# Patient Record
Sex: FEMALE | Race: BLACK OR AFRICAN AMERICAN | NOT HISPANIC OR LATINO | Employment: UNEMPLOYED | URBAN - METROPOLITAN AREA
[De-identification: names, ages, dates, MRNs, and addresses within clinical notes are randomized per-mention and may not be internally consistent; named-entity substitution may affect disease eponyms.]

---
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
## 2024-01-10 ENCOUNTER — MYC MEDICAL ADVICE (OUTPATIENT)
Dept: DERMATOLOGY | Facility: CLINIC | Age: 19
End: 2024-01-10
Payer: COMMERCIAL

## 2024-02-29 ENCOUNTER — MYC MEDICAL ADVICE (OUTPATIENT)
Dept: DERMATOLOGY | Facility: CLINIC | Age: 19
End: 2024-02-29
Payer: COMMERCIAL

## 2024-04-08 ENCOUNTER — PATIENT OUTREACH (OUTPATIENT)
Dept: CARE COORDINATION | Facility: CLINIC | Age: 19
End: 2024-04-08
Payer: COMMERCIAL

## 2024-04-08 NOTE — PROGRESS NOTES
Clinic Care Coordination Contact  Program:   [facility]: [place]   Renewal: [facility]   Date Applied:      JOSIAH Outreach:   4/8/24:  1st outreach attempt. Left a message on voicemail with call back information and requested return call.  Plan: CTA will call again within 2 weeks.  Tammy Pena  Care   [facility]  Clinic Care Coordination  [phone]         Health Insurance:        Referral/Screening:

## 2024-04-22 ENCOUNTER — PATIENT OUTREACH (OUTPATIENT)
Dept: CARE COORDINATION | Facility: CLINIC | Age: 19
End: 2024-04-22
Payer: COMMERCIAL

## 2024-04-22 NOTE — PROGRESS NOTES
Clinic Care Coordination Contact  Program:   [facility]: [place]   Renewal: [facility]   Date Applied:      JOSIAH Outreach:   4/22/24: 2nd outreach attempt. Left message on voicemail indicating last outreach attempt. CTA left [facility] number for renewal follow up.  Plan: CTA will no longer make outreach  Tammy Carranza   [facility]  Clinic Care Coordination  [phone]    4/8/24:  1st outreach attempt. Left a message on voicemail with call back information and requested return call.  Plan: CTA will call again within 2 weeks.  Tammy Carranza   [facility]  Clinic Care Coordination  [phone]         Health Insurance:        Referral/Screening:

## 2024-05-21 ENCOUNTER — MYC MEDICAL ADVICE (OUTPATIENT)
Dept: DERMATOLOGY | Facility: CLINIC | Age: 19
End: 2024-05-21
Payer: COMMERCIAL

## 2024-07-06 ENCOUNTER — HEALTH MAINTENANCE LETTER (OUTPATIENT)
Age: 19
End: 2024-07-06

## 2024-07-12 NOTE — PROGRESS NOTES
[facility] Dermatology Note  Encounter Date: Jul 15, 2024  Office Visit     Dermatology Problem List:  1. Acne Vulgaris   Current tx: , Benzoyl peroxide wash, tretinoin 0.025% cream nightly, Azelaic Acid 15%  Previous tx: clindamycin 1% gel  2. Hyperpigmentation/PIH, bilateral axillae    We see the patients mom    ____________________________________________    Assessment & Plan:    #Acne Vulgaris. Using abstinence. Still not at goal. Completed doxycycline 180 days. Next menses due around 7/28/24  -Tretinoin nightly, alternating 0.025% with newer 0.05%  -stop acne medications  - Reviewed options of spironolactone, OCP, or Accutane. Pt wants accutane and is abstinent. Patient agreeable to start Accutane process.  Plan to start at lower dose.  -Discussion of the risks and side effects of isotretinoin including but not limited to mucocutaneous dryness, arthralgias, myalgias, depression, suicidal ideation, headache, blurred vision, increase in liver function tests, increase in lipids, and teratogenic effects. Discussed need for two forms of contraception. The iPledge program brochure was provided and the contents discussed with the patient. The patient was counseled they cannot give blood while on isotretinoin. No personal or family history of inflammatory bowel disease or hypertriglyceridemia known to patient. Reviewed need to avoid alcohol on medication. iPledge program consent was obtained. Patient counseled that if they wear contacts eye may become too dry to tolerate and recommend follow up with eye doctor if this occurs. Counseled on possibility of of bloody nose. Reviewed to call for flares. Reviewed photosensitivity and need for sunscreen. Avoid skin procedures unless done by this clinic. The patient will stop all acne medications and use gentle cleansing products such as Cetaphil or CeraVe.       -Baseline labs including alt and fasting triglycerides HCG  will be obtained at baseline    -Total cumulative dose 0 mg. (Date 7/15/24)    - Reviewed Winlevi option but not covered.    - Labs ordered today: ALT, AST, Triglycerides, hCG quantitative   -Future considerations: OCP's, doxycycline, or spirolactone          Procedures Performed:   none    Follow-up: 7 week(s) virtually (telephone with photos), or earlier for new or changing lesions    Staff and Scribe:     Scribe Disclosure:   I, Radha Castillo, am serving as a scribe to document services personally performed by Gilma Felipe MD based on data collection and the provider's statements to me.     Provider Disclosure:   The documentation recorded by the scribe accurately reflects the services I personally performed and the decisions made by me.    Gilma Felipe MD    Department of Dermatology  [facility]: Phone: [phone], Fax:[phone]  [facility]: Phone: [phone], Fax: [phone]   ____________________________________________    CC: Acne (Patient reports that acne is unstable, closed comedones have improved. Patient reports medications have also been helping. )    HPI:  Ms. Isamar Oliveria is a(n) 18 year old female who presents today as a return patient for acne.    Today, patient reports she feels acne is unstable but have had some improvements. Completed doxycycline. Alternating between tretinoin 0.05% and 0.025%. Using the clindamycin in the AM.       Patient is otherwise feeling well, without additional skin concerns.    Labs Reviewed:  N/A    Physical Exam:  Vitals: There were no vitals taken for this visit.  SKIN: Focused examination of face was performed.  - Acneiform papules and pustules on R cheek and forehead.     - No other lesions of concern on areas examined.     Medications:  Current Outpatient Medications   Medication Sig Dispense Refill    albuterol (PROAIR HFA/PROVENTIL HFA/VENTOLIN HFA) 108 (90  Base) MCG/ACT inhaler Inhale 2 puffs into the lungs every 6 hours as needed for shortness of breath, wheezing or cough 18 g 0    clindamycin (CLEOCIN T) 1 % external lotion Apply once daily to affected area 60 mL 5    doxycycline hyclate (VIBRAMYCIN) 100 MG capsule Take 1 capsule (100 mg) by mouth 2 times daily 180 capsule 0    tretinoin (RETIN-A) 0.05 % external cream Use a pea sized every other night on the face 45 g 3    tretinoin (RETIN-A) 0.025 % external cream Use a pea sized every other night on the face (Patient not taking: Reported on 7/15/2024) 45 g 3     No current facility-administered medications for this visit.      Past Medical History:   There is no problem list on file for this patient.    No past medical history on file.     CC Gilma Felipe MD  [address] [facility] [address]  [place] [address] on close of this encounter.

## 2024-07-15 ENCOUNTER — OFFICE VISIT (OUTPATIENT)
Dept: DERMATOLOGY | Facility: CLINIC | Age: 19
End: 2024-07-15
Attending: DERMATOLOGY
Payer: COMMERCIAL

## 2024-07-15 ENCOUNTER — MYC MEDICAL ADVICE (OUTPATIENT)
Dept: DERMATOLOGY | Facility: CLINIC | Age: 19
End: 2024-07-15

## 2024-07-15 DIAGNOSIS — Z76.89 ENCOUNTER PRIOR TO INITIATION OF MEDICATION: Primary | ICD-10-CM

## 2024-07-15 DIAGNOSIS — L70.0 ACNE VULGARIS: ICD-10-CM

## 2024-07-15 PROCEDURE — 99214 OFFICE O/P EST MOD 30 MIN: CPT | Performed by: DERMATOLOGY

## 2024-07-15 RX ORDER — AZELAIC ACID 0.15 G/G
GEL TOPICAL
Qty: 50 G | Refills: 3 | Status: CANCELLED | OUTPATIENT
Start: 2024-07-15

## 2024-07-15 ASSESSMENT — PAIN SCALES - GENERAL: PAINLEVEL: NO PAIN (0)

## 2024-07-15 NOTE — LETTER
7/15/2024      Isamar Oliveira  [address]  [place] [address]      Dear Colleague,    Thank you for referring your patient, Isamar Oliveira, to the [facility]. Please see a copy of my visit note below.      [facility] Dermatology Note  Encounter Date: Jul 15, 2024  Office Visit     Dermatology Problem List:  1. Acne Vulgaris   Current tx: , Benzoyl peroxide wash, tretinoin 0.025% cream nightly, Azelaic Acid 15%  Previous tx: clindamycin 1% gel  2. Hyperpigmentation/PIH, bilateral axillae    We see the patients mom    ____________________________________________    Assessment & Plan:    #Acne Vulgaris. Using abstinence. Still not at goal. Completed doxycycline 180 days. Next menses due around 7/28/24  -Tretinoin nightly, alternating 0.025% with newer 0.05%  -stop acne medications  - Reviewed options of spironolactone, OCP, or Accutane. Pt wants accutane and is abstinent. Patient agreeable to start Accutane process.  Plan to start at lower dose.  -Discussion of the risks and side effects of isotretinoin including but not limited to mucocutaneous dryness, arthralgias, myalgias, depression, suicidal ideation, headache, blurred vision, increase in liver function tests, increase in lipids, and teratogenic effects. Discussed need for two forms of contraception. The iPledge program brochure was provided and the contents discussed with the patient. The patient was counseled they cannot give blood while on isotretinoin. No personal or family history of inflammatory bowel disease or hypertriglyceridemia known to patient. Reviewed need to avoid alcohol on medication. iPledge program consent was obtained. Patient counseled that if they wear contacts eye may become too dry to tolerate and recommend follow up with eye doctor if this occurs. Counseled on possibility of of bloody nose. Reviewed to call for flares. Reviewed photosensitivity and need for sunscreen. Avoid skin  procedures unless done by this clinic. The patient will stop all acne medications and use gentle cleansing products such as Cetaphil or CeraVe.       -Baseline labs including alt and fasting triglycerides HCG  will be obtained at baseline   -Total cumulative dose 0 mg. (Date 7/15/24)    - Reviewed Winlevi option but not covered.    - Labs ordered today: ALT, AST, Triglycerides, hCG quantitative   -Future considerations: OCP's, doxycycline, or spirolactone          Procedures Performed:   none    Follow-up: 7 week(s) virtually (telephone with photos), or earlier for new or changing lesions    Staff and Scribe:     Scribe Disclosure:   I, Radha Castillo, am serving as a scribe to document services personally performed by Gilma Felipe MD based on data collection and the provider's statements to me.     Provider Disclosure:   The documentation recorded by the scribe accurately reflects the services I personally performed and the decisions made by me.    Gilma Felipe MD    Department of Dermatology  [facility]: Phone: [phone], Fax:[phone]  [facility]: Phone: [phone], Fax: [phone]   ____________________________________________    CC: Acne (Patient reports that acne is unstable, closed comedones have improved. Patient reports medications have also been helping. )    HPI:  Ms. Isamar Oliveira is a(n) 18 year old female who presents today as a return patient for acne.    Today, patient reports she feels acne is unstable but have had some improvements. Completed doxycycline. Alternating between tretinoin 0.05% and 0.025%. Using the clindamycin in the AM.       Patient is otherwise feeling well, without additional skin concerns.    Labs Reviewed:  N/A    Physical Exam:  Vitals: There were no vitals taken for this visit.  SKIN: Focused examination of face was performed.  - Acneiform  papules and pustules on R cheek and forehead.     - No other lesions of concern on areas examined.     Medications:  Current Outpatient Medications   Medication Sig Dispense Refill     albuterol (PROAIR HFA/PROVENTIL HFA/VENTOLIN HFA) 108 (90 Base) MCG/ACT inhaler Inhale 2 puffs into the lungs every 6 hours as needed for shortness of breath, wheezing or cough 18 g 0     clindamycin (CLEOCIN T) 1 % external lotion Apply once daily to affected area 60 mL 5     doxycycline hyclate (VIBRAMYCIN) 100 MG capsule Take 1 capsule (100 mg) by mouth 2 times daily 180 capsule 0     tretinoin (RETIN-A) 0.05 % external cream Use a pea sized every other night on the face 45 g 3     tretinoin (RETIN-A) 0.025 % external cream Use a pea sized every other night on the face (Patient not taking: Reported on 7/15/2024) 45 g 3     No current facility-administered medications for this visit.      Past Medical History:   There is no problem list on file for this patient.    No past medical history on file.     CC Gilma Felipe MD  [address] [place] [address] [address]  [place] [address] on close of this encounter.      Again, thank you for allowing me to participate in the care of your patient.        Sincerely,        Gilma Felipe MD

## 2024-07-15 NOTE — NURSING NOTE
Dermatology Rooming Note    Isamar Oliveira's goals for this visit include:   Chief Complaint   Patient presents with    Acne     Patient reports that acne is unstable, closed comedones have improved. Patient reports medications have also been helping.         - Does patient need refills? No    - Last visit was 10- (virtual) w/ Emily Hassan

## 2024-07-22 ENCOUNTER — ALLIED HEALTH/NURSE VISIT (OUTPATIENT)
Dept: DERMATOLOGY | Facility: CLINIC | Age: 19
End: 2024-07-22
Payer: COMMERCIAL

## 2024-07-22 DIAGNOSIS — L70.0 ACNE VULGARIS: Primary | ICD-10-CM

## 2024-07-22 PROCEDURE — 99207 PR NO CHARGE NURSE ONLY: CPT | Performed by: DERMATOLOGY

## 2024-07-22 NOTE — NURSING NOTE
Isamar Oliveira comes into clinic today at the request of Dr. Felipe Ordering Provider for Accutane.      This service provided today was under the supervising provider of the day Dr. Felipe, who was available if needed.    Jo Menjivar RN Pt presents to clinic to sign iPledge forms to initiate Accutane. I explained the importance of timing with Accutane in regards to submitting a pregnancy test as well as picking up the prescription in the allotted time frame. Pt verbalized understanding.

## 2024-08-01 ENCOUNTER — LAB (OUTPATIENT)
Dept: LAB | Facility: CLINIC | Age: 19
End: 2024-08-01
Payer: COMMERCIAL

## 2024-08-01 DIAGNOSIS — Z76.89 ENCOUNTER PRIOR TO INITIATION OF MEDICATION: ICD-10-CM

## 2024-08-01 LAB
ALT SERPL W P-5'-P-CCNC: 10 U/L (ref 0–50)
AST SERPL W P-5'-P-CCNC: 20 U/L (ref 0–35)
FASTING STATUS PATIENT QL REPORTED: YES
HCG INTACT+B SERPL-ACNC: <1 MIU/ML
TRIGL SERPL-MCNC: 46 MG/DL

## 2024-08-01 PROCEDURE — 84702 CHORIONIC GONADOTROPIN TEST: CPT

## 2024-08-01 PROCEDURE — 84478 ASSAY OF TRIGLYCERIDES: CPT

## 2024-08-01 PROCEDURE — 36415 COLL VENOUS BLD VENIPUNCTURE: CPT

## 2024-08-01 PROCEDURE — 84450 TRANSFERASE (AST) (SGOT): CPT

## 2024-08-01 PROCEDURE — 84460 ALANINE AMINO (ALT) (SGPT): CPT

## 2024-08-01 SDOH — HEALTH STABILITY: PHYSICAL HEALTH: ON AVERAGE, HOW MANY DAYS PER WEEK DO YOU ENGAGE IN MODERATE TO STRENUOUS EXERCISE (LIKE A BRISK WALK)?: 4 DAYS

## 2024-08-01 SDOH — HEALTH STABILITY: PHYSICAL HEALTH: ON AVERAGE, HOW MANY MINUTES DO YOU ENGAGE IN EXERCISE AT THIS LEVEL?: 30 MIN

## 2024-08-01 ASSESSMENT — SOCIAL DETERMINANTS OF HEALTH (SDOH): HOW OFTEN DO YOU GET TOGETHER WITH FRIENDS OR RELATIVES?: TWICE A WEEK

## 2024-08-02 ENCOUNTER — OFFICE VISIT (OUTPATIENT)
Dept: FAMILY MEDICINE | Facility: CLINIC | Age: 19
End: 2024-08-02
Payer: COMMERCIAL

## 2024-08-02 VITALS
BODY MASS INDEX: 22.71 KG/M2 | HEIGHT: 64 IN | RESPIRATION RATE: 16 BRPM | WEIGHT: 133 LBS | DIASTOLIC BLOOD PRESSURE: 75 MMHG | HEART RATE: 69 BPM | TEMPERATURE: 97.9 F | SYSTOLIC BLOOD PRESSURE: 109 MMHG | OXYGEN SATURATION: 100 %

## 2024-08-02 DIAGNOSIS — R14.0 ABDOMINAL BLOATING: ICD-10-CM

## 2024-08-02 DIAGNOSIS — Z00.00 ROUTINE GENERAL MEDICAL EXAMINATION AT A HEALTH CARE FACILITY: Primary | ICD-10-CM

## 2024-08-02 DIAGNOSIS — R06.2 WHEEZING: ICD-10-CM

## 2024-08-02 DIAGNOSIS — Z11.3 SCREENING FOR STDS (SEXUALLY TRANSMITTED DISEASES): ICD-10-CM

## 2024-08-02 PROCEDURE — 99395 PREV VISIT EST AGE 18-39: CPT | Performed by: PREVENTIVE MEDICINE

## 2024-08-02 PROCEDURE — 99213 OFFICE O/P EST LOW 20 MIN: CPT | Mod: 25 | Performed by: PREVENTIVE MEDICINE

## 2024-08-02 RX ORDER — FLUTICASONE PROPIONATE AND SALMETEROL 100; 50 UG/1; UG/1
1 POWDER RESPIRATORY (INHALATION) EVERY 12 HOURS
Qty: 60 EACH | Refills: 3 | Status: SHIPPED | OUTPATIENT
Start: 2024-08-02

## 2024-08-02 ASSESSMENT — PAIN SCALES - GENERAL: PAINLEVEL: NO PAIN (0)

## 2024-08-02 NOTE — PATIENT INSTRUCTIONS
To schedule lung function tests please call .       Patient Education   Preventive Care Advice   This is general advice given by our system to help you stay healthy. However, your care team may have specific advice just for you. Please talk to your care team about your preventive care needs.  Nutrition  Eat 5 or more servings of fruits and vegetables each day.  Try wheat bread, brown rice and whole grain pasta (instead of white bread, rice, and pasta).  Get enough calcium and vitamin D. Check the label on foods and aim for 100% of the RDA (recommended daily allowance).  Lifestyle  Exercise at least 150 minutes each week  (30 minutes a day, 5 days a week).  Do muscle strengthening activities 2 days a week. These help control your weight and prevent disease.  No smoking.  Wear sunscreen to prevent skin cancer.  Have a dental exam and cleaning every 6 months.  Yearly exams  See your health care team every year to talk about:  Any changes in your health.  Any medicines your care team has prescribed.  Preventive care, family planning, and ways to prevent chronic diseases.  Shots (vaccines)   HPV shots (up to age 26), if you've never had them before.  Hepatitis B shots (up to age 59), if you've never had them before.  COVID-19 shot: Get this shot when it's due.  Flu shot: Get a flu shot every year.  Tetanus shot: Get a tetanus shot every 10 years.  Pneumococcal, hepatitis A, and RSV shots: Ask your care team if you need these based on your risk.  Shingles shot (for age 50 and up)  General health tests  Diabetes screening:  Starting at age 35, Get screened for diabetes at least every 3 years.  If you are younger than age 35, ask your care team if you should be screened for diabetes.  Cholesterol test: At age 39, start having a cholesterol test every 5 years, or more often if advised.  Bone density scan (DEXA): At age 50, ask your care team if you should have this scan for osteoporosis (brittle  bones).  Hepatitis C: Get tested at least once in your life.  STIs (sexually transmitted infections)  Before age 24: Ask your care team if you should be screened for STIs.  After age 24: Get screened for STIs if you're at risk. You are at risk for STIs (including HIV) if:  You are sexually active with more than one person.  You don't use condoms every time.  You or a partner was diagnosed with a sexually transmitted infection.  If you are at risk for HIV, ask about PrEP medicine to prevent HIV.  Get tested for HIV at least once in your life, whether you are at risk for HIV or not.  Cancer screening tests  Cervical cancer screening: If you have a cervix, begin getting regular cervical cancer screening tests starting at age 21.  Breast cancer scan (mammogram): If you've ever had breasts, begin having regular mammograms starting at age 40. This is a scan to check for breast cancer.  Colon cancer screening: It is important to start screening for colon cancer at age 45.  Have a colonoscopy test every 10 years (or more often if you're at risk) Or, ask your provider about stool tests like a FIT test every year or Cologuard test every 3 years.  To learn more about your testing options, visit:   .  For help making a decision, visit:   https://bit.ly/ds17582.  Prostate cancer screening test: If you have a prostate, ask your care team if a prostate cancer screening test (PSA) at age 55 is right for you.  Lung cancer screening: If you are a current or former smoker ages 50 to 80, ask your care team if ongoing lung cancer screenings are right for you.  For informational purposes only. Not to replace the advice of your health care provider. Copyright   2023 [employer]. All rights reserved. Clinically reviewed by the [facility] Transitions Program. [employer] 968126 - REV 01/24.

## 2024-08-02 NOTE — PROGRESS NOTES
Preventive Care Visit  [facility] PATT Paniagua MD, Family Medicine  Aug 2, 2024      Assessment & Plan     Routine general medical examination at a health care facility  -screening guidelines reviewed   - REVIEW OF HEALTH MAINTENANCE PROTOCOL ORDERS    Screening for STDs (sexually transmitted diseases)  -NOT needed since patient has never been sexually active     Wheezing  -diagnosis of Asthma no established hence proceed with Lung function tests for diagnostic purposes   - General PFT Lab (Please always keep checked)  - Pulmonary Function Test  - fluticasone-salmeterol (ADVAIR) 100-50 MCG/ACT inhaler  Dispense: 60 each; Refill: 3    Abdominal bloating  -await labs   - Helicobacter pylori Antigen Stool          Counseling  Appropriate preventive services were addressed with this patient via screening, questionnaire, or discussion as appropriate for fall prevention, nutrition, physical activity, Tobacco-use cessation, weight loss and cognition.  Checklist reviewing preventive services available has been given to the patient.  Reviewed patient's diet, addressing concerns and/or questions.   The patient was instructed to see the dentist every 6 months.       Delmar Penn is a 18 year old, presenting for the following:  Physical        8/2/2024     9:48 AM   Additional Questions   Roomed by roque   Accompanied by self        Health Care Directive  Patient does not have a Health Care Directive or Living Will: Discussed advance care planning with patient; information given to patient to review.    HPI  Patient would like to discuss asthma; patient stated that she has noticed that it has been becoming increasingly worse- even when she is not physically active- patient also states that she notices it gets worse when she is sick as well      Patient would also like to discuss digestive problems; states that she noticed she is painfully bloated a lot with loose stool     More asthma  symptoms+  No night time symptoms  Has been using inhaler  More with cold or URI   Symptoms is more wheezing and coughing     No tobacco  No vaping     Dad with asthma     More bloating with food+  More gassiness  Not much belching and burping  More around menses too.       On Accutane for acne per Derm         8/1/2024   General Health   How would you rate your overall physical health? Good   Feel stress (tense, anxious, or unable to sleep) Not at all            8/1/2024   Nutrition   Three or more servings of calcium each day? Yes   Diet: Regular (no restrictions)   How many servings of fruit and vegetables per day? (!) 2-3   How many sweetened beverages each day? 0-1            8/1/2024   Exercise   Days per week of moderate/strenous exercise 4 days   Average minutes spent exercising at this level 30 min            8/1/2024   Social Factors   Frequency of gathering with friends or relatives Twice a week   Worry food won't last until get money to buy more No   Food not last or not have enough money for food? No   Do you have housing? (Housing is defined as stable permanent housing and does not include staying ouside in a car, in a tent, in an abandoned building, in an overnight shelter, or couch-surfing.) No   Are you worried about losing your housing? No   Lack of transportation? No   Unable to get utilities (heat,electricity)? No   Want help with housing or utility concern? No      (!) HOUSING CONCERN PRESENT      8/1/2024   Dental   Dentist two times every year? (!) NO            8/1/2024   TB Screening   Were you born outside of the [place]? No              Today's PHQ-2 Score:       7/15/2024    12:38 PM   PHQ-2 ( 1999 [employer])   Q1: Little interest or pleasure in doing things 0   Q2: Feeling down, depressed or hopeless 0   PHQ-2 Score 0         8/1/2024   Substance Use   Alcohol more than 3/day or more than 7/wk Not Applicable   Do you use any other substances recreationally? No        Social History     Tobacco  Use    Smoking status: Never    Smokeless tobacco: Never   Vaping Use    Vaping status: Never Used   Substance Use Topics    Alcohol use: No    Drug use: No           8/1/2024   STI Screening   New sexual partner(s) since last STI/HIV test? (!) DECLINE        History of abnormal Pap smear: No - under age 21, PAP not appropriate for age             8/1/2024   Contraception/Family Planning   Questions about contraception or family planning No           Reviewed and updated as needed this visit by Provider   Tobacco  Allergies  Meds  Problems  Med Hx  Surg Hx  Fam Hx            History reviewed. No pertinent past medical history.  History reviewed. No pertinent surgical history.  Lab work is in process  Labs reviewed in EPIC  BP Readings from Last 3 Encounters:   08/02/24 109/75   04/14/23 106/71   07/09/21 99/65 (14%, Z = -1.08 /  46%, Z = -0.10)*     *BP percentiles are based on the 2017 AAP Clinical Practice Guideline for girls    Wt Readings from Last 3 Encounters:   08/02/24 60.3 kg (133 lb) (59%, Z= 0.23)*   10/16/23 56.7 kg (125 lb) (48%, Z= -0.05)*   04/14/23 56.2 kg (124 lb) (49%, Z= -0.03)*     * Growth percentiles are based on CDC (Girls, 2-20 Years) data.                  There is no problem list on file for this patient.    History reviewed. No pertinent surgical history.    Social History     Tobacco Use    Smoking status: Never    Smokeless tobacco: Never   Substance Use Topics    Alcohol use: No     Family History   Problem Relation Age of Onset    Glaucoma No family hx of     Macular Degeneration No family hx of          Current Outpatient Medications   Medication Sig Dispense Refill    albuterol (PROAIR HFA/PROVENTIL HFA/VENTOLIN HFA) 108 (90 Base) MCG/ACT inhaler Inhale 2 puffs into the lungs every 6 hours as needed for shortness of breath, wheezing or cough 18 g 0    fluticasone-salmeterol (ADVAIR) 100-50 MCG/ACT inhaler Inhale 1 puff into the lungs every 12 hours 60 each 3    tretinoin  "(RETIN-A) 0.05 % external cream Use a pea sized every other night on the face 45 g 3     No Known Allergies      Review of Systems  Constitutional, HEENT, cardiovascular, pulmonary, gi and gu systems are negative, except as otherwise noted.     Objective    Exam  /75 (BP Location: Left arm, Patient Position: Sitting, Cuff Size: Adult Regular)   Pulse 69   Temp 97.9  F (36.6  C) (Temporal)   Resp 16   Ht 1.626 m (5' 4\")   Wt 60.3 kg (133 lb)   LMP 07/26/2024   SpO2 100%   BMI 22.83 kg/m     Estimated body mass index is 22.83 kg/m  as calculated from the following:    Height as of this encounter: 1.626 m (5' 4\").    Weight as of this encounter: 60.3 kg (133 lb).    Physical Exam  GENERAL APPEARANCE: healthy, alert and no distress  EYES: Eyes grossly normal to inspection and conjunctivae and sclerae normal  RESP: lungs clear to auscultation - no rales, rhonchi or wheezes  CV: regular rates and rhythm, normal S1 S2, no S3 or S4 and no murmur, click or rub  ABDOMEN: soft, non-tender and no rebound or guarding   MS: extremities normal- no gross deformities noted and peripheral pulses normal  NEURO: Normal strength and tone, mentation intact and speech normal  PSYCH: mentation appears normal    : Exam declined by parent/patient.  Reason for decline: Patient/Parental preference      Vision Screen  Vision Screen Details  Does the patient have corrective lenses (glasses/contacts)?: No  Vision Acuity Screen  Vision Acuity Tool: Keller  RIGHT EYE: 10/16 (20/32)  LEFT EYE: 10/16 (20/32)  Vision Screen Results: Pass    Hearing Screen           Signed Electronically by: Bethany Paniagua MD MPH      "

## 2024-08-19 NOTE — RESULT ENCOUNTER NOTE
For bernadette, this patient it appears did come in and sign the forms but she is not up in ipledge for me to sign off on. Can someone please enter

## 2024-08-30 NOTE — RESULT ENCOUNTER NOTE
Patient Enrollment Complete.  Your patient's REMS ID [id number]    Your patient will receive additional information by their preferred method of communication.

## 2024-09-03 ENCOUNTER — TELEPHONE (OUTPATIENT)
Dept: DERMATOLOGY | Facility: CLINIC | Age: 19
End: 2024-09-03
Payer: COMMERCIAL

## 2024-09-03 NOTE — TELEPHONE ENCOUNTER
Pt registered in ipledge per Matteo.    Irena Ferrera RN on 9/3/2024 at 9:16 AM        HCG quantitative pregnancy  Order: [id number]  Status: Final result       Visible to patient: Yes (seen)       Dx: Encounter prior to initiation of medi...    3 Result Notes     Component  Ref Range & Units 1 mo ago  hCG Quantitative  <5 mIU/mL <1  Comment: Adult: 0-5 mIU/mL for healthy non-pregnant person  Neonates: Should be within normal ranges by 2 days after birth  Resulting Agency [facility]          Specimen Collected: 08/01/24 10:50 AM Last Resulted: 08/01/24  9:17 PM       Result Notes     Gilma Felipe MD  8/30/2024  6:40 PM CDT Back to Top    Patient Enrollment Complete.  Your patient's REMS ID [id number]     Your patient will receive additional information by their preferred method of communication.   Jo Menjivar RN  8/21/2024  9:17 AM CDT     You cannot register a female in iPledge until you have a negative pregnancy test. I will enroll now.   Gilma Felipe MD  8/19/2024  4:41 PM CDT     For bernadette, this patient it appears did come in and sign the forms but she is not up in ipledge for me to sign off on. Can someone please enter

## 2024-09-20 ENCOUNTER — MYC MEDICAL ADVICE (OUTPATIENT)
Dept: DERMATOLOGY | Facility: CLINIC | Age: 19
End: 2024-09-20
Payer: COMMERCIAL

## 2024-09-20 NOTE — PROGRESS NOTES
[facility] Dermatology Note  Encounter Date: Sep 23, 2024  Store-and-Forward and Telephone ([phone]). Location of teledermatologist: [facility].  Start time: 11:23am. End time: 11:39am.    Dermatology Problem List:  1. Acne Vulgaris   Current tx: , Benzoyl peroxide wash, tretinoin 0.025% cream nightly, Azelaic Acid 15%  Previous tx: clindamycin 1% gel  2. Hyperpigmentation/PIH, bilateral axillae     We see the patients mom    ____________________________________________    Assessment & Plan:     #Acne Vulgaris. Using abstinence. Still not at goal. Completed doxycycline 180 days. Last visit patient wanted to start Accutane over aquilino.   - Labs reviewed today: Triglycerides, ALT, AST, hCG quantitative(negative) from 8/1/24. All WNL  --Discussion of the risks and side effects of isotretinoin including but not limited to mucocutaneous dryness, arthralgias, myalgias, depression, suicidal ideation, headache, blurred vision, increase in liver function tests, increase in lipids, and teratogenic effects. Discussed meaning abstinence.  The iPledge program brochure was provided and the contents discussed with the patient. The patient was counseled they cannot give blood while on isotretinoin. No personal or family history of inflammatory bowel disease or hypertriglyceridemia known to patient. Reviewed need to avoid alcohol on medication. iPledge program consent was obtained. Patient counseled that if they wear contacts eye may become too dry to tolerate and recommend follow up with eye doctor if this occurs. Counseled on possibility of of bloody nose. Reviewed to call for flares. Reviewed photosensitivity and need for sunscreen. Avoid skin procedures unless done by this clinic. The patient will stop all acne medications and use gentle cleansing products such as Cetaphil or CeraVe.Birth defects reviewed.   - Start Isotretinoin when pregnancy test negative  -pending in office  "blood draw, is on period today  -the patient is abstinent and has been since 8/1/2024  -Total cumulative dose 0 mg. (Date 7/15/24)       -Future considerations: OCP's, doxycycline, or spirolactone   -29 \"First day patient may have a pregnancy test  More\"    #Dark circles around eyes, consider skin better eye cream after done with accutane  -teamine eye cream. Revision for now, okay to buy from them directly    # Dark patches, axilla  -trial skin discoloration defense and if not better in 12 weeks will need in person visit  -limit shaving, using shave if you will shave    Procedures Performed:    None    Follow-up: 1 month and do in person pregnancy test the 29th to start accutaen 30mg daily (weighs 130mg), or earlier for new or changing lesions    Staff and Scribe:   Scribe Disclosure:   I, Radha Castillo, am serving as a scribe to document services personally performed by Gilma Felipe MD based on data collection and the provider's statements to me.     Provider Disclosure:   The documentation recorded by the scribe accurately reflects the services I personally performed and the decisions made by me.    Gilma Felipe MD    Department of Dermatology  [facility]: Phone: [phone], Fax:[phone]  [facility]: Phone: [phone], Fax: [phone]   ____________________________________________    CC: Acne (Accutane start)    HPI:  Ms. Isamar Oliveira is a(n) 18 year old female who presents today as a return patient for acne.    Today, patient reports she would like to start accutane'  No new medical issues    Has been abstinent    Mood has been good    Patient is otherwise feeling well, without additional skin concerns.    Labs Reviewed:  Component      Latest Ref Rng 8/1/2024  10:50 AM   Triglycerides      <=90 mg/dL 46    Patient Fasting? Yes    hCG Quantitative      <5 mIU/mL <1    ALT      0 " - 50 U/L 10    AST      0 - 35 U/L 20          Physical Exam:  Vitals: LMP 07/26/2024   SKIN: Teledermatology photos were reviewed; image quality and interpretability:  acceptable. Image date: 9/20/2024.  - forehead, chin cheek acne  - No other lesions of concern on areas examined.     Medications:  Current Outpatient Medications   Medication Sig Dispense Refill    albuterol (PROAIR HFA/PROVENTIL HFA/VENTOLIN HFA) 108 (90 Base) MCG/ACT inhaler Inhale 2 puffs into the lungs every 6 hours as needed for shortness of breath, wheezing or cough 18 g 0    fluticasone-salmeterol (ADVAIR) 100-50 MCG/ACT inhaler Inhale 1 puff into the lungs every 12 hours 60 each 3    tretinoin (RETIN-A) 0.05 % external cream Use a pea sized every other night on the face 45 g 3     No current facility-administered medications for this visit.      Past Medical/Surgical History:   There is no problem list on file for this patient.    No past medical history on file.    CC Gilma Felipe MD  [address]  [place] [address] on close of this encounter.

## 2024-09-23 ENCOUNTER — VIRTUAL VISIT (OUTPATIENT)
Dept: DERMATOLOGY | Facility: CLINIC | Age: 19
End: 2024-09-23
Attending: DERMATOLOGY
Payer: COMMERCIAL

## 2024-09-23 DIAGNOSIS — L70.0 ACNE VULGARIS: Primary | ICD-10-CM

## 2024-09-23 DIAGNOSIS — Z76.89 ENCOUNTER PRIOR TO INITIATION OF MEDICATION: ICD-10-CM

## 2024-09-23 PROCEDURE — 99214 OFFICE O/P EST MOD 30 MIN: CPT | Performed by: DERMATOLOGY

## 2024-09-23 NOTE — LETTER
9/23/2024      Isamar Oliveira  [address]  [place] [address]      Dear Colleague,    Thank you for referring your patient, Isamar Oliveira, to the [facility]. Please see a copy of my visit note below.      [facility] Dermatology Note  Encounter Date: Sep 23, 2024  Store-and-Forward and Telephone ([phone]). Location of teledermatologist: [facility].  Start time: 11:23am. End time: 11:39am.    Dermatology Problem List:  1. Acne Vulgaris   Current tx: , Benzoyl peroxide wash, tretinoin 0.025% cream nightly, Azelaic Acid 15%  Previous tx: clindamycin 1% gel  2. Hyperpigmentation/PIH, bilateral axillae     We see the patients mom    ____________________________________________    Assessment & Plan:     #Acne Vulgaris. Using abstinence. Still not at goal. Completed doxycycline 180 days. Last visit patient wanted to start Accutane over aquilino.   - Labs reviewed today: Triglycerides, ALT, AST, hCG quantitative(negative) from 8/1/24. All WNL  --Discussion of the risks and side effects of isotretinoin including but not limited to mucocutaneous dryness, arthralgias, myalgias, depression, suicidal ideation, headache, blurred vision, increase in liver function tests, increase in lipids, and teratogenic effects. Discussed meaning abstinence.  The iPledge program brochure was provided and the contents discussed with the patient. The patient was counseled they cannot give blood while on isotretinoin. No personal or family history of inflammatory bowel disease or hypertriglyceridemia known to patient. Reviewed need to avoid alcohol on medication. iPledge program consent was obtained. Patient counseled that if they wear contacts eye may become too dry to tolerate and recommend follow up with eye doctor if this occurs. Counseled on possibility of of bloody nose. Reviewed to call for flares. Reviewed photosensitivity and need for sunscreen. Avoid  "skin procedures unless done by this clinic. The patient will stop all acne medications and use gentle cleansing products such as Cetaphil or CeraVe.Birth defects reviewed.   - Start Isotretinoin when pregnancy test negative  -pending in office blood draw, is on period today  -the patient is abstinent and has been since 8/1/2024  -Total cumulative dose 0 mg. (Date 7/15/24)       -Future considerations: OCP's, doxycycline, or spirolactone   -29 \"First day patient may have a pregnancy test  More\"    #Dark circles around eyes, consider skin better eye cream after done with accutane  -teamine eye cream. Revision for now, okay to buy from them directly    # Dark patches, axilla  -trial skin discoloration defense and if not better in 12 weeks will need in person visit  -limit shaving, using shave if you will shave    Procedures Performed:    None    Follow-up: 1 month and do in person pregnancy test the 29th to start accutaen 30mg daily (weighs 130mg), or earlier for new or changing lesions    Staff and Scribe:   Scribe Disclosure:   I, Radha Castillo, am serving as a scribe to document services personally performed by Gilma Felipe MD based on data collection and the provider's statements to me.     Provider Disclosure:   The documentation recorded by the scribe accurately reflects the services I personally performed and the decisions made by me.    Gilma Felipe MD    Department of Dermatology  [facility]: Phone: [phone], Fax:[phone]  [facility]: Phone: [phone], Fax: [phone]   ____________________________________________    CC: Acne (Accutane start)    HPI:  Ms. Isamar Oliveira is a(n) 18 year old female who presents today as a return patient for acne.    Today, patient reports she would like to start accutane'  No new medical issues    Has been abstinent    Mood has been " good    Patient is otherwise feeling well, without additional skin concerns.    Labs Reviewed:  Component      Latest Ref Rng 8/1/2024  10:50 AM   Triglycerides      <=90 mg/dL 46    Patient Fasting? Yes    hCG Quantitative      <5 mIU/mL <1    ALT      0 - 50 U/L 10    AST      0 - 35 U/L 20          Physical Exam:  Vitals: [facility] 07/26/2024   SKIN: Teledermatology photos were reviewed; image quality and interpretability:  acceptable. Image date: 9/20/2024.  - forehead, chin cheek acne  - No other lesions of concern on areas examined.     Medications:  Current Outpatient Medications   Medication Sig Dispense Refill     albuterol (PROAIR HFA/PROVENTIL HFA/VENTOLIN HFA) 108 (90 Base) MCG/ACT inhaler Inhale 2 puffs into the lungs every 6 hours as needed for shortness of breath, wheezing or cough 18 g 0     fluticasone-salmeterol (ADVAIR) 100-50 MCG/ACT inhaler Inhale 1 puff into the lungs every 12 hours 60 each 3     tretinoin (RETIN-A) 0.05 % external cream Use a pea sized every other night on the face 45 g 3     No current facility-administered medications for this visit.      Past Medical/Surgical History:   There is no problem list on file for this patient.    No past medical history on file.    CC Gilma Felipe MD  [address] [place] [address] [address]  [place] [address] on close of this encounter.      Again, thank you for allowing me to participate in the care of your patient.        Sincerely,        Gilma Felipe MD

## 2024-09-23 NOTE — NURSING NOTE
Teledermatology Nurse Call Patients:     Are you in the [facility] at the time of the encounter? yes    Today's visit will be billed to you and your insurance.    A teledermatology visit is not as thorough as an in-person visit and the quality of the photograph sent may not be of the same quality as that taken by the dermatology clinic.    Manuela Garcia RN

## 2024-10-04 ENCOUNTER — LAB (OUTPATIENT)
Dept: LAB | Facility: CLINIC | Age: 19
End: 2024-10-04
Payer: COMMERCIAL

## 2024-10-04 DIAGNOSIS — Z76.89 ENCOUNTER PRIOR TO INITIATION OF MEDICATION: ICD-10-CM

## 2024-10-04 LAB — HCG INTACT+B SERPL-ACNC: <1 MIU/ML

## 2024-10-04 PROCEDURE — 84702 CHORIONIC GONADOTROPIN TEST: CPT

## 2024-10-04 PROCEDURE — 36415 COLL VENOUS BLD VENIPUNCTURE: CPT

## 2024-10-09 ENCOUNTER — MYC MEDICAL ADVICE (OUTPATIENT)
Dept: DERMATOLOGY | Facility: CLINIC | Age: 19
End: 2024-10-09
Payer: COMMERCIAL

## 2024-10-09 DIAGNOSIS — L70.0 ACNE VULGARIS: Primary | ICD-10-CM

## 2024-10-09 RX ORDER — ISOTRETINOIN 30 MG/1
30 CAPSULE ORAL DAILY
Qty: 30 CAPSULE | Refills: 0 | Status: SHIPPED | OUTPATIENT
Start: 2024-10-09 | End: 2024-10-28

## 2024-10-09 NOTE — TELEPHONE ENCOUNTER
RX sent according to visit note.     Start Isotretinoin when pregnancy test negative     Follow-up: 1 month and do in person pregnancy test the 29th to start accutaen 30mg daily (weighs 130mg), or earlier for new or changing lesions

## 2024-10-27 NOTE — PROGRESS NOTES
[facility] Dermatology Note  Encounter Date: Oct 28, 2024  Store-and-Forward and Telephone ([phone]). Location of teledermatologist: [facility].  Start time: 10:50am. End time: 10:53am.    Dermatology Problem List:  1. Acne Vulgaris   Current tx: isotretinoin 30 mg (initiated 10/9/24)  Previous tx: clindamycin 1% gel, Benzoyl peroxide wash, tretinoin 0.025% cream nightly, Azelaic Acid 15%  2. Hyperpigmentation/PIH, bilateral axillae  3. #Dark circles around eyes, consider skin better eye cream, skin discoloration defense   -teamine eye cream. Revision for now, okay to buy from them directly     We see the patients mom    ____________________________________________    Assessment & Plan:     #Acne Vulgaris. Using abstinence. Still active.   - Labs reviewed today: hCG quantitative from 10/4/24, NEW ONE IS pending  - increase to accutane 60mg daily if pregnancy test is negative  -the patient is abstinent- no changes  -Total cumulative dose 900 mg. (Date 10/28/24)      -Future considerations:  NEEDS LABS drawn at follow up due to increased dose    -next visit labs due are alt/ast and TGs along with HCG  -call for flares, reviewed prednisone wiould be initiated. 30 for 1 week, 20 for 1 week, then 10mg daily for 1 week, then stop      -The patient was again counseled on the following: The patient cannot give blood while on isotretinoin or share the drug  Reviewed need to avoid alcohol on medication. Eye dryness and bloody nose are possible side effects and the patient should seek care of these if they occur. The patient will call for severe flares and seek care along with stopping the drug for SI or mood issues.  Reviewed to call for flares. Reviewed photosensitivity and need for sunscreen.            Procedures Performed:    None    Follow-up: labs today, then increase dose via RN to 60mg daily of accutane if pregnancy test negative, see Dr. Felipe with labs done prior  1 month(s)  , or earlier for new or changing lesions    Staff and Scribe:   Scribe Disclosure:   I, Radha Castillo, am serving as a scribe to document services personally performed by Gilma Felipe MD based on data collection and the provider's statements to me.     Provider Disclosure:   The documentation recorded by the scribe accurately reflects the services I personally performed and the decisions made by me.    Gilma Felipe MD    Department of Dermatology  [facility]: Phone: [phone], Fax:[phone]  [facility]: Phone: [phone], Fax: [phone] ____________________________________________    CC: Acne (Patient reports things are going well with accutane, has not noticed results yet as she just completed her first month. She has noticed that she does not have any painful pimples. ) and Accutane    HPI:  Ms. Isamar Oliveira is a(n) 18 year old female who presents today as a return patient for Accutane.    Today, patient reports doing well  The patient reports tolerable mucocutaneous dryness, and denies arthralgias, myalgias, depression, suicidal ideation, diarrhea, headache, or blurred vision.    One day of blurry visit and went away, has been gone a month. (Reviewed if happens again notify clinic )    She feels better    Is abstinent and understands the meaning    Patient is otherwise feeling well, without additional skin concerns.    Labs Reviewed:  Component      Latest Ref Rng 10/4/2024  10:33 AM   hCG Quantitative      <5 mIU/mL <1          Physical Exam:  Vitals: There were no vitals taken for this visit.  SKIN: Teledermatology photos were reviewed; image quality and interpretability:  acceptable. Image date:  today.  - comedones and acneiform papules on the cheeks and forehead  - No other lesions of concern on areas examined.     Medications:  Current Outpatient Medications    Medication Sig Dispense Refill    albuterol (PROAIR HFA/PROVENTIL HFA/VENTOLIN HFA) 108 (90 Base) MCG/ACT inhaler Inhale 2 puffs into the lungs every 6 hours as needed for shortness of breath, wheezing or cough 18 g 0    fluticasone-salmeterol (ADVAIR) 100-50 MCG/ACT inhaler Inhale 1 puff into the lungs every 12 hours 60 each 3    ISOtretinoin (ABSORICA) 30 MG capsule Take 1 capsule (30 mg) by mouth daily. 30 capsule 0     No current facility-administered medications for this visit.      Past Medical/Surgical History:   There is no problem list on file for this patient.    No past medical history on file.    CC Gilma Felipe MD  [address] [facility] [address]  [place] [address] on close of this encounter.

## 2024-10-28 ENCOUNTER — VIRTUAL VISIT (OUTPATIENT)
Dept: DERMATOLOGY | Facility: CLINIC | Age: 19
End: 2024-10-28
Attending: DERMATOLOGY
Payer: COMMERCIAL

## 2024-10-28 ENCOUNTER — MYC MEDICAL ADVICE (OUTPATIENT)
Dept: DERMATOLOGY | Facility: CLINIC | Age: 19
End: 2024-10-28

## 2024-10-28 DIAGNOSIS — Z51.81 MEDICATION MONITORING ENCOUNTER: Primary | ICD-10-CM

## 2024-10-28 DIAGNOSIS — L70.0 ACNE VULGARIS: ICD-10-CM

## 2024-10-28 PROCEDURE — 99207 PR NO BILLABLE SERVICE THIS VISIT: CPT | Performed by: DERMATOLOGY

## 2024-10-28 ASSESSMENT — PAIN SCALES - GENERAL: PAINLEVEL_OUTOF10: NO PAIN (0)

## 2024-10-28 NOTE — LETTER
10/28/2024      Isamar Oliveira  [address]  [place] [address]      Dear Colleague,    Thank you for referring your patient, Isamar Oliveira, to the [facility]. Please see a copy of my visit note below.      [facility] Dermatology Note  Encounter Date: Oct 28, 2024  Store-and-Forward and Telephone ([phone]). Location of teledermatologist: [facility].  Start time: 10:50am. End time: 10:53am.    Dermatology Problem List:  1. Acne Vulgaris   Current tx: isotretinoin 30 mg (initiated 10/9/24)  Previous tx: clindamycin 1% gel, Benzoyl peroxide wash, tretinoin 0.025% cream nightly, Azelaic Acid 15%  2. Hyperpigmentation/PIH, bilateral axillae  3. #Dark circles around eyes, consider skin better eye cream, skin discoloration defense   -teamine eye cream. Revision for now, okay to buy from them directly     We see the patients mom    ____________________________________________    Assessment & Plan:     #Acne Vulgaris. Using abstinence. Still active.   - Labs reviewed today: hCG quantitative from 10/4/24, NEW ONE IS pending  - increase to accutane 60mg daily if pregnancy test is negative  -the patient is abstinent- no changes  -Total cumulative dose 900 mg. (Date 10/28/24)      -Future considerations:  NEEDS LABS drawn at follow up due to increased dose    -next visit labs due are alt/ast and TGs along with HCG  -call for flares, reviewed prednisone wiould be initiated. 30 for 1 week, 20 for 1 week, then 10mg daily for 1 week, then stop      -The patient was again counseled on the following: The patient cannot give blood while on isotretinoin or share the drug  Reviewed need to avoid alcohol on medication. Eye dryness and bloody nose are possible side effects and the patient should seek care of these if they occur. The patient will call for severe flares and seek care along with stopping the drug for SI or mood issues.  Reviewed to call  for flares. Reviewed photosensitivity and need for sunscreen.            Procedures Performed:    None    Follow-up: labs today, then increase dose via RN to 60mg daily of accutane if pregnancy test negative, see Dr. Felipe with labs done prior 1 month(s)  , or earlier for new or changing lesions    Staff and Scribe:   Scribe Disclosure:   I, Radha Jonathan, am serving as a scribe to document services personally performed by Gilma Felipe MD based on data collection and the provider's statements to me.     Provider Disclosure:   The documentation recorded by the scribe accurately reflects the services I personally performed and the decisions made by me.    Gilma Felipe MD    Department of Dermatology  [facility]: Phone: [phone], Fax:[phone]  [facility]: Phone: [phone], Fax: [phone] ____________________________________________    CC: Acne (Patient reports things are going well with accutane, has not noticed results yet as she just completed her first month. She has noticed that she does not have any painful pimples. ) and Accutane    HPI:  Ms. Isamar Oliveira is a(n) 18 year old female who presents today as a return patient for Accutane.    Today, patient reports doing well  The patient reports tolerable mucocutaneous dryness, and denies arthralgias, myalgias, depression, suicidal ideation, diarrhea, headache, or blurred vision.    One day of blurry visit and went away, has been gone a month. (Reviewed if happens again notify clinic )    She feels better    Is abstinent and understands the meaning    Patient is otherwise feeling well, without additional skin concerns.    Labs Reviewed:  Component      Latest Ref Rng 10/4/2024  10:33 AM   hCG Quantitative      <5 mIU/mL <1          Physical Exam:  Vitals: There were no vitals taken for this visit.  SKIN: Teledermatology photos  were reviewed; image quality and interpretability:  acceptable. Image date:  today.  - comedones and acneiform papules on the cheeks and forehead  - No other lesions of concern on areas examined.     Medications:  Current Outpatient Medications   Medication Sig Dispense Refill     albuterol (PROAIR HFA/PROVENTIL HFA/VENTOLIN HFA) 108 (90 Base) MCG/ACT inhaler Inhale 2 puffs into the lungs every 6 hours as needed for shortness of breath, wheezing or cough 18 g 0     fluticasone-salmeterol (ADVAIR) 100-50 MCG/ACT inhaler Inhale 1 puff into the lungs every 12 hours 60 each 3     ISOtretinoin (ABSORICA) 30 MG capsule Take 1 capsule (30 mg) by mouth daily. 30 capsule 0     No current facility-administered medications for this visit.      Past Medical/Surgical History:   There is no problem list on file for this patient.    No past medical history on file.    CC Gilma Felipe MD  [address] [facility] [address]  [place] [address] on close of this encounter.     Teledermatology Nurse Call Patients:     Are you in the [facility] at the time of the encounter? yes    Today's visit will be billed to you and your insurance.    A teledermatology visit is not as thorough as an in-person visit and the quality of the photograph sent may not be of the same quality as that taken by the dermatology clinic.  Gabriela Smith LPN      Again, thank you for allowing me to participate in the care of your patient.        Sincerely,        Gilma Felipe MD

## 2024-10-28 NOTE — PROGRESS NOTES
Teledermatology Nurse Call Patients:     Are you in the [facility] at the time of the encounter? yes    Today's visit will be billed to you and your insurance.    A teledermatology visit is not as thorough as an in-person visit and the quality of the photograph sent may not be of the same quality as that taken by the dermatology clinic.  Gabriela Smith LPN

## 2024-11-06 ENCOUNTER — LAB (OUTPATIENT)
Dept: LAB | Facility: CLINIC | Age: 19
End: 2024-11-06
Payer: COMMERCIAL

## 2024-11-06 DIAGNOSIS — Z11.3 SCREENING FOR STDS (SEXUALLY TRANSMITTED DISEASES): ICD-10-CM

## 2024-11-06 DIAGNOSIS — Z51.81 MEDICATION MONITORING ENCOUNTER: ICD-10-CM

## 2024-11-06 LAB
FASTING STATUS PATIENT QL REPORTED: YES
HCG INTACT+B SERPL-ACNC: <1 MIU/ML
TRIGL SERPL-MCNC: 91 MG/DL

## 2024-11-06 PROCEDURE — 84702 CHORIONIC GONADOTROPIN TEST: CPT

## 2024-11-06 PROCEDURE — 84478 ASSAY OF TRIGLYCERIDES: CPT

## 2024-11-06 PROCEDURE — 36415 COLL VENOUS BLD VENIPUNCTURE: CPT

## 2024-11-08 ENCOUNTER — MYC MEDICAL ADVICE (OUTPATIENT)
Dept: DERMATOLOGY | Facility: CLINIC | Age: 19
End: 2024-11-08
Payer: COMMERCIAL

## 2024-11-08 DIAGNOSIS — L70.0 ACNE VULGARIS: ICD-10-CM

## 2024-11-08 RX ORDER — ISOTRETINOIN 30 MG/1
60 CAPSULE ORAL DAILY
Qty: 60 CAPSULE | Refills: 0 | Status: SHIPPED | OUTPATIENT
Start: 2024-11-08

## 2024-11-27 ENCOUNTER — OFFICE VISIT (OUTPATIENT)
Dept: NURSING | Facility: CLINIC | Age: 19
End: 2024-11-27
Attending: PREVENTIVE MEDICINE
Payer: COMMERCIAL

## 2024-11-27 VITALS — BODY MASS INDEX: 24.41 KG/M2 | HEIGHT: 64 IN | OXYGEN SATURATION: 98 % | WEIGHT: 143 LBS | HEART RATE: 87 BPM

## 2024-11-27 DIAGNOSIS — R06.2 WHEEZING: ICD-10-CM

## 2024-11-28 LAB
DLCOUNC-%PRED-PRE: 109 %
DLCOUNC-PRE: 23.95 ML/MIN/MMHG
DLCOUNC-PRED: 21.87 ML/MIN/MMHG
ERV-%PRED-PRE: 99 %
ERV-PRE: 1.24 L
ERV-PRED: 1.25 L
EXPTIME-PRE: 5.45 SEC
FEF2575-%PRED-PRE: 79 %
FEF2575-PRE: 2.97 L/SEC
FEF2575-PRED: 3.74 L/SEC
FEFMAX-%PRED-PRE: 73 %
FEFMAX-PRE: 4.99 L/SEC
FEFMAX-PRED: 6.82 L/SEC
FEV1-%PRED-PRE: 89 %
FEV1-PRE: 2.77 L
FEV1FEV6-PRE: 86 %
FEV1FEV6-PRED: 87 %
FEV1FVC-PRE: 85 %
FEV1FVC-PRED: 89 %
FEV1SVC-PRE: 86 %
FEV1SVC-PRED: 82 %
FIFMAX-PRE: 2.96 L/SEC
FRCPLETH-%PRED-PRE: 96 %
FRCPLETH-PRE: 2.56 L
FRCPLETH-PRED: 2.67 L
FVC-%PRED-PRE: 92 %
FVC-PRE: 3.24 L
FVC-PRED: 3.49 L
IC-%PRED-PRE: 79 %
IC-PRE: 1.97 L
IC-PRED: 2.49 L
RVPLETH-%PRED-PRE: 98 %
RVPLETH-PRE: 1.32 L
RVPLETH-PRED: 1.34 L
TLCPLETH-%PRED-PRE: 91 %
TLCPLETH-PRE: 4.54 L
TLCPLETH-PRED: 4.94 L
VA-%PRED-PRE: 95 %
VA-PRE: 4.43 L
VC-%PRED-PRE: 84 %
VC-PRE: 3.21 L
VC-PRED: 3.8 L

## 2024-12-05 NOTE — PROGRESS NOTES
[facility] Dermatology Note  Encounter Date: Dec 9, 2024  Store-and-Forward and Telephone ([phone]). Location of teledermatologist: [facility].  Start time: 1133am. End time: 11:37am.    Dermatology Problem List:  1. Acne Vulgaris   Current tx: isotretinoin 30 mg (initiated 10/9/24)  Previous tx: clindamycin 1% gel, Benzoyl peroxide wash, tretinoin 0.025% cream nightly, Azelaic Acid 15%  2. Hyperpigmentation/PIH, bilateral axillae  3. #Dark circles around eyes, consider skin better eye cream, skin discoloration defense   -teamine eye cream. Revision for now, okay to buy from them directly     We see the patients mom    ____________________________________________    Assessment & Plan:     #Acne Vulgaris. Using abstinence. Still abstinent and I Reviewed this meaning.   - Labs ordered today: pregnancy, tg, alt and ast  - increase to accutane 60mg daily if pregnancy test is negative, she has not done this yet    -Total cumulative dose 1800 mg. (Date 12/9/24)    , repeat labs at follow up        -The patient was again counseled on the following: The patient cannot give blood while on isotretinoin or share the drug  Reviewed need to avoid alcohol on medication. Eye dryness and bloody nose are possible side effects and the patient should seek care of these if they occur. The patient will call for severe flares and seek care along with stopping the drug for SI or mood issues.  Reviewed to call for flares. Reviewed photosensitivity and need for sunscreen.     Procedures Performed:    None    Follow-up: 1 month  and also labs wed, then nursing can sent accutane 60mg daily if negative.     Staff and Scribe:   Scribe Disclosure:   I, Radha Castillo, am serving as a scribe to document services personally performed by Gilma Felipe MD based on data collection and the provider's statements to me.     Provider Disclosure:   The documentation recorded by the scribe accurately  reflects the services I personally performed and the decisions made by me.    Gilma Felipe MD    Department of Dermatology  [facility]: Phone: [phone], Fax:[phone]  [facility]: Phone: [phone], Fax: [phone]   ____________________________________________    CC: No chief complaint on file.    HPI:  Ms. Isamar Oliveira is a(n) 18 year old female who presents today as a return patient for Accutane.    Today, patient reports acneiform papules on the forehead  The patient reports tolerable mucocutaneous dryness, and denies arthralgias, myalgias, depression, suicidal ideation, diarrhea, headache, or blurred vision.        Patient is otherwise feeling well, without additional skin concerns.    Labs Reviewed:  Component      Latest Ref Rng 11/6/2024  10:39 AM   Triglycerides      <90 mg/dL 91 (H)    Patient Fasting? Yes    hCG Quantitative      <5 mIU/mL <1       Legend:  (H) High    Physical Exam:  Vitals: There were no vitals taken for this visit.  SKIN: Teledermatology photos were reviewed; image quality and interpretability:  acceptable. Image date:  hyperpigmetnation  -moles not interpretable.  - acneiform papules on the face  - No other lesions of concern on areas examined.     Medications:  Current Outpatient Medications   Medication Sig Dispense Refill    albuterol (PROAIR HFA/PROVENTIL HFA/VENTOLIN HFA) 108 (90 Base) MCG/ACT inhaler Inhale 2 puffs into the lungs every 6 hours as needed for shortness of breath, wheezing or cough 18 g 0    fluticasone-salmeterol (ADVAIR) 100-50 MCG/ACT inhaler Inhale 1 puff into the lungs every 12 hours 60 each 3    ISOtretinoin (ABSORICA) 30 MG capsule Take 2 capsules (60 mg) by mouth daily. 60 capsule 0     No current facility-administered medications for this visit.      Past Medical/Surgical History:   There is no problem list on file for  this patient.    No past medical history on file.    CC Gilma Felipe MD  [address] [facility] [address]  [place] [address] on close of this encounter.

## 2024-12-08 ENCOUNTER — MYC MEDICAL ADVICE (OUTPATIENT)
Dept: DERMATOLOGY | Facility: CLINIC | Age: 19
End: 2024-12-08
Payer: COMMERCIAL

## 2024-12-09 ENCOUNTER — VIRTUAL VISIT (OUTPATIENT)
Dept: DERMATOLOGY | Facility: CLINIC | Age: 19
End: 2024-12-09
Attending: DERMATOLOGY
Payer: COMMERCIAL

## 2024-12-09 DIAGNOSIS — Z51.81 MEDICATION MONITORING ENCOUNTER: Primary | ICD-10-CM

## 2024-12-09 ASSESSMENT — PAIN SCALES - GENERAL: PAINLEVEL_OUTOF10: NO PAIN (0)

## 2024-12-09 NOTE — PROGRESS NOTES
Teledermatology Nurse Call Patients:     Are you in the [facility] at the time of the encounter? yes    Today's visit will be billed to you and your insurance.    A teledermatology visit is not as thorough as an in-person visit and the quality of the photograph sent may not be of the same quality as that taken by the dermatology clinic.    Gabriela Smith LPN

## 2024-12-09 NOTE — LETTER
12/9/2024      Isamar Oliveira  [address]  [place] [address]      Dear Colleague,    Thank you for referring your patient, Isamar Oliveira, to the [facility]. Please see a copy of my visit note below.      [facility] Dermatology Note  Encounter Date: Dec 9, 2024  Store-and-Forward and Telephone ([phone]). Location of teledermatologist: [facility].  Start time: 1133am. End time: 11:37am.    Dermatology Problem List:  1. Acne Vulgaris   Current tx: isotretinoin 30 mg (initiated 10/9/24)  Previous tx: clindamycin 1% gel, Benzoyl peroxide wash, tretinoin 0.025% cream nightly, Azelaic Acid 15%  2. Hyperpigmentation/PIH, bilateral axillae  3. #Dark circles around eyes, consider skin better eye cream, skin discoloration defense   -teamine eye cream. Revision for now, okay to buy from them directly     We see the patients mom    ____________________________________________    Assessment & Plan:     #Acne Vulgaris. Using abstinence. Still abstinent and I Reviewed this meaning.   - Labs ordered today: pregnancy, tg, alt and ast  - increase to accutane 60mg daily if pregnancy test is negative, she has not done this yet    -Total cumulative dose 1800 mg. (Date 12/9/24)    , repeat labs at follow up        -The patient was again counseled on the following: The patient cannot give blood while on isotretinoin or share the drug  Reviewed need to avoid alcohol on medication. Eye dryness and bloody nose are possible side effects and the patient should seek care of these if they occur. The patient will call for severe flares and seek care along with stopping the drug for SI or mood issues.  Reviewed to call for flares. Reviewed photosensitivity and need for sunscreen.     Procedures Performed:    None    Follow-up: 1 month  and also labs wed, then nursing can sent accutane 60mg daily if negative.     Staff and Scribe:   Scribe Disclosure:   I,  Radha Castillo, am serving as a scribe to document services personally performed by Gilma Felipe MD based on data collection and the provider's statements to me.     Provider Disclosure:   The documentation recorded by the scribe accurately reflects the services I personally performed and the decisions made by me.    Gilma Felipe MD    Department of Dermatology  [facility]: Phone: [phone], Fax:[phone]  [facility]: Phone: [phone], Fax: [phone]   ____________________________________________    CC: No chief complaint on file.    HPI:  Ms. Isamar Oliveira is a(n) 18 year old female who presents today as a return patient for Accutane.    Today, patient reports acneiform papules on the forehead  The patient reports tolerable mucocutaneous dryness, and denies arthralgias, myalgias, depression, suicidal ideation, diarrhea, headache, or blurred vision.        Patient is otherwise feeling well, without additional skin concerns.    Labs Reviewed:  Component      Latest Ref Rng 11/6/2024  10:39 AM   Triglycerides      <90 mg/dL 91 (H)    Patient Fasting? Yes    hCG Quantitative      <5 mIU/mL <1       Legend:  (H) High    Physical Exam:  Vitals: There were no vitals taken for this visit.  SKIN: Teledermatology photos were reviewed; image quality and interpretability:  acceptable. Image date:  hyperpigmetnation  -moles not interpretable.  - acneiform papules on the face  - No other lesions of concern on areas examined.     Medications:  Current Outpatient Medications   Medication Sig Dispense Refill     albuterol (PROAIR HFA/PROVENTIL HFA/VENTOLIN HFA) 108 (90 Base) MCG/ACT inhaler Inhale 2 puffs into the lungs every 6 hours as needed for shortness of breath, wheezing or cough 18 g 0     fluticasone-salmeterol (ADVAIR) 100-50 MCG/ACT inhaler Inhale 1 puff into the lungs every 12 hours 60  each 3     ISOtretinoin (ABSORICA) 30 MG capsule Take 2 capsules (60 mg) by mouth daily. 60 capsule 0     No current facility-administered medications for this visit.      Past Medical/Surgical History:   There is no problem list on file for this patient.    No past medical history on file.    CC Gilma Felipe MD  [address] [facility] [address]  [place] [address] on close of this encounter.      Teledermatology Nurse Call Patients:     Are you in the [facility] at the time of the encounter? yes    Today's visit will be billed to you and your insurance.    A teledermatology visit is not as thorough as an in-person visit and the quality of the photograph sent may not be of the same quality as that taken by the dermatology clinic.    Gabriela Smith LPN        Again, thank you for allowing me to participate in the care of your patient.        Sincerely,        Gilma Felipe MD

## 2024-12-16 ENCOUNTER — LAB (OUTPATIENT)
Dept: LAB | Facility: CLINIC | Age: 19
End: 2024-12-16
Payer: COMMERCIAL

## 2024-12-16 DIAGNOSIS — L70.0 ACNE VULGARIS: ICD-10-CM

## 2024-12-16 DIAGNOSIS — Z51.81 MEDICATION MONITORING ENCOUNTER: ICD-10-CM

## 2024-12-16 LAB
ALT SERPL W P-5'-P-CCNC: 8 U/L (ref 0–50)
AST SERPL W P-5'-P-CCNC: 20 U/L (ref 0–45)
FASTING STATUS PATIENT QL REPORTED: NO
HCG INTACT+B SERPL-ACNC: <1 MIU/ML
TRIGL SERPL-MCNC: 118 MG/DL

## 2024-12-16 PROCEDURE — 36415 COLL VENOUS BLD VENIPUNCTURE: CPT

## 2024-12-16 PROCEDURE — 84478 ASSAY OF TRIGLYCERIDES: CPT

## 2024-12-16 PROCEDURE — 84460 ALANINE AMINO (ALT) (SGPT): CPT

## 2024-12-16 PROCEDURE — 84702 CHORIONIC GONADOTROPIN TEST: CPT

## 2024-12-16 PROCEDURE — 84450 TRANSFERASE (AST) (SGOT): CPT

## 2024-12-16 RX ORDER — ISOTRETINOIN 30 MG/1
60 CAPSULE ORAL DAILY
Qty: 60 CAPSULE | Refills: 0 | Status: SHIPPED | OUTPATIENT
Start: 2024-12-16

## 2025-01-09 ENCOUNTER — VIRTUAL VISIT (OUTPATIENT)
Dept: DERMATOLOGY | Facility: CLINIC | Age: 20
End: 2025-01-09
Payer: COMMERCIAL

## 2025-01-09 DIAGNOSIS — L70.0 ACNE VULGARIS: ICD-10-CM

## 2025-01-09 DIAGNOSIS — Z51.81 MEDICATION MONITORING ENCOUNTER: Primary | ICD-10-CM

## 2025-01-09 NOTE — LETTER
1/9/2025      Isamar Oliveira  [address]  [place] [address]      Dear Colleague,    Thank you for referring your patient, Isamar Oliveira, to the [facility]. Please see a copy of my visit note below.    [facility] Dermatology Note  Encounter Date: Jan 9, 2025    Teledermatology telephone visit information:  - Location of patient: [place]  - Patient presented as: return  - Location of teledermatologist:  ([facility] )  - Image quality and interpretability: acceptable  - Provider has received verbal consent for a Video/Photos Visit from the patient? YES  - Date of images: 1/9/2025  - Service start time:11:00 am  - Service end time:11:10 pm  - Date of report: 01/09/25     Dermatology Problem List:  # Acne vulgaris  Current tx: isotretinoin 60 mg (initiated 10/9/24)  Previous tx: clindamycin 1% gel, Benzoyl peroxide wash, tretinoin 0.025% cream nightly, Azelaic Acid 15%  # Hyperpigmentation/PIH, bilateral axillae  # Dark circles around eyes, consider skin better eye cream, skin discoloration defense   -teamine eye cream. Revision for now, okay to buy from them directly  ____________________________________________    Assessment & Plan:     # Acne vulgaris, on Isotretinoin therapy  # High risk medication monitoring  - Overall the patient reports doing well on isotretinoin therapy without any significant side effects. We will plan to continue at 60 mg daily pending lab results. Urine pregnancy test to be completed today, will update in ipledge when pregnancy test is final if negative.    Previous daily dose: 60 mg/day  Current daily dose: 60 mg/day  Cumulative dose to date: 3600 mg  Weight-based target dose: 7,788 to 9,735 mg (120-150 mg/kg can go up to 220 mg/kg in this 64.9 kg patient)  Birth control:  Abstinence    Isotretinoin (Accutane) education:  -While on Accutane: do not use other topical acne medications. Do not share  medication. Do not get pregnant (including one month after stopping medication). Do not donate blood. Do not wax. -Do not get laser, peels, or aggressive facials while on Accutane of for 6 months after.   -Females must  their prescription within 7 days of having urine pregnancy test.  -Dry lips and mouth, minor swelling of the eyelids or lips, crusty skin, nosebleeds, GI upset, or thinning of hair may occur. If any of these effects persist or worsen, tell your doctor or pharmacist promptly.   -To relieve dry mouth, suck on (sugarless) hard candy or ice chips, chew (sugarless) gum, drink water.   -Remember that your doctor has prescribed this medication because he or she has judged that the benefit to you is greater than the risk of side effects. Many people using this medication do not have serious side effects.   -Contact office immediately if you have any of these unlikely but serious side effects: mental/mood changes (e.g., depression,  aggressive or violent behavior, and in rare cases, thoughts of suicide), tingling feeling in the skin, quick/severe sun sensitivity, back/joint/muscle pain, signs of infection (e.g., fever, persistent sore throat, painful swallowing, peeling skin on palms/soles.   Isotretinoin may infrequently cause disease of the pancreatitis, that may rarely be fatal. Stop taking this medication and contact office immediately if you develop: severe stomach pain severe or persistent GI upset.  -Stop taking this medication and tell your doctor immediately if you develop these unlikely but very serious side effects: severe headache, vision changes, ear ringing, hearling loss, chest pain, yellowing eyes, skin, dark urine, severe diarrhea, rectal bleeding,   -Seek immediate medical attention if you notice any symptoms of a serious allergic reaction.  -Wait 6 months after stopping accutane before getting piercing, tattoos, and/or laser treatment.    -Accutane is discussed fully with the  patient. It is a very effective drug to treat acne vulgaris but has many potential significant side effects. Chief among these are teratogensis, hepatic injury, dyslipidemia and severe drying of the mucous membranes. All of these issues have been discussed in details. Blood tests to monitor lipids and liver functions will be necessary. Expect painful dryness and/or fissuring around the lips, eyes, and other moist areas of the body. Balms may be protective. Contact lens may be too painful to wear temporarily while on this drug. Episodes of significant depression have been reported, including suicidal ideation and attempts in rare cases. It may also cause pseudotumor cerebri and hyperostosis. The patient will report any such changes in mood, depressive symptoms or suicidal thoughts, headaches, joint or bone pains. There is also a possible association with inflammatory bowel disease, although this is unproven at this point.    Follow-up: 1 month, sooner PRN     All risks, benefits and alternatives were discussed with patient.  Patient is in agreement and understands the assessment and plan.  All questions were answered.  Rachael Mcdermott PA-C  [facility]  ____________________________________________    CC: Acne, on isotretinoin therapy    HPI:  Ms. Isamar Oliveira is a(n) 19 year old female who presents today for a telephone visit for acne on isotretinoin therapy. Patient has completed 3 months of Accutane.  She is currently taking 60 mg daily and is tolerating this well without any significant side effects.  She does endorse a few nosebleeds and dry lips which is tolerable.  Her skin is clearing nicely and she is happy with her progress.    Patient denies vision changes, headaches, myalgias, joint aches, GI upset, chronic diarrhea, blood in the stool, rashes, mood changes, suicidal ideation. Denies blood donation, surgeries/procedures and has not shared the medication.    Patient is otherwise feeling well,  without additional skin concerns.    Physical Exam:  General: Awake, alert, and with appropriate affect.  Skin:  In-person physical examination was deferred as this was a phone visit.  Patient's submitted photographs were reviewed. Few acneiform papules and macules on the face.    Medications:  Current Outpatient Medications   Medication Sig Dispense Refill     albuterol (PROAIR HFA/PROVENTIL HFA/VENTOLIN HFA) 108 (90 Base) MCG/ACT inhaler Inhale 2 puffs into the lungs every 6 hours as needed for shortness of breath, wheezing or cough 18 g 0     fluticasone-salmeterol (ADVAIR) 100-50 MCG/ACT inhaler Inhale 1 puff into the lungs every 12 hours 60 each 3     ISOtretinoin (ABSORICA) 30 MG capsule Take 2 capsules (60 mg) by mouth daily. 60 capsule 0     No current facility-administered medications for this visit.      Past Medical History:   There is no problem list on file for this patient.    No past medical history on file.    CC Referred [facility], [place]  No address on file on close of this encounter.       Again, thank you for allowing me to participate in the care of your patient.        Sincerely,        Moni Mcdermott PA-C    Electronically signed

## 2025-01-09 NOTE — PROGRESS NOTES
[facility] Dermatology Note  Encounter Date: Jan 9, 2025    Teledermatology telephone visit information:  - Location of patient: [place]  - Patient presented as: return  - Location of teledermatologist:  ([facility] )  - Image quality and interpretability: acceptable  - Provider has received verbal consent for a Video/Photos Visit from the patient? YES  - Date of images: 1/9/2025  - Service start time:11:00 am  - Service end time:11:10 pm  - Date of report: 01/09/25     Dermatology Problem List:  # Acne vulgaris  Current tx: isotretinoin 60 mg (initiated 10/9/24)  Previous tx: clindamycin 1% gel, Benzoyl peroxide wash, tretinoin 0.025% cream nightly, Azelaic Acid 15%  # Hyperpigmentation/PIH, bilateral axillae  # Dark circles around eyes, consider skin better eye cream, skin discoloration defense   -teamine eye cream. Revision for now, okay to buy from them directly  ____________________________________________    Assessment & Plan:     # Acne vulgaris, on Isotretinoin therapy  # High risk medication monitoring  - Overall the patient reports doing well on isotretinoin therapy without any significant side effects. We will plan to continue at 60 mg daily pending lab results. Urine pregnancy test to be completed today, will update in ipledge when pregnancy test is final if negative.    Previous daily dose: 60 mg/day  Current daily dose: 60 mg/day  Cumulative dose to date: 3600 mg  Weight-based target dose: 7,788 to 9,735 mg (120-150 mg/kg can go up to 220 mg/kg in this 64.9 kg patient)  Birth control:  Abstinence    Isotretinoin (Accutane) education:  -While on Accutane: do not use other topical acne medications. Do not share medication. Do not get pregnant (including one month after stopping medication). Do not donate blood. Do not wax. -Do not get laser, peels, or aggressive facials while on Accutane of for 6 months after.   -Females must  their prescription  within 7 days of having urine pregnancy test.  -Dry lips and mouth, minor swelling of the eyelids or lips, crusty skin, nosebleeds, GI upset, or thinning of hair may occur. If any of these effects persist or worsen, tell your doctor or pharmacist promptly.   -To relieve dry mouth, suck on (sugarless) hard candy or ice chips, chew (sugarless) gum, drink water.   -Remember that your doctor has prescribed this medication because he or she has judged that the benefit to you is greater than the risk of side effects. Many people using this medication do not have serious side effects.   -Contact office immediately if you have any of these unlikely but serious side effects: mental/mood changes (e.g., depression,  aggressive or violent behavior, and in rare cases, thoughts of suicide), tingling feeling in the skin, quick/severe sun sensitivity, back/joint/muscle pain, signs of infection (e.g., fever, persistent sore throat, painful swallowing, peeling skin on palms/soles.   Isotretinoin may infrequently cause disease of the pancreatitis, that may rarely be fatal. Stop taking this medication and contact office immediately if you develop: severe stomach pain severe or persistent GI upset.  -Stop taking this medication and tell your doctor immediately if you develop these unlikely but very serious side effects: severe headache, vision changes, ear ringing, hearling loss, chest pain, yellowing eyes, skin, dark urine, severe diarrhea, rectal bleeding,   -Seek immediate medical attention if you notice any symptoms of a serious allergic reaction.  -Wait 6 months after stopping accutane before getting piercing, tattoos, and/or laser treatment.    -Accutane is discussed fully with the patient. It is a very effective drug to treat acne vulgaris but has many potential significant side effects. Chief among these are teratogensis, hepatic injury, dyslipidemia and severe drying of the mucous membranes. All of these issues have been  discussed in details. Blood tests to monitor lipids and liver functions will be necessary. Expect painful dryness and/or fissuring around the lips, eyes, and other moist areas of the body. Balms may be protective. Contact lens may be too painful to wear temporarily while on this drug. Episodes of significant depression have been reported, including suicidal ideation and attempts in rare cases. It may also cause pseudotumor cerebri and hyperostosis. The patient will report any such changes in mood, depressive symptoms or suicidal thoughts, headaches, joint or bone pains. There is also a possible association with inflammatory bowel disease, although this is unproven at this point.    Follow-up: 1 month, sooner PRN     All risks, benefits and alternatives were discussed with patient.  Patient is in agreement and understands the assessment and plan.  All questions were answered.  Rachael Mcdermott PA-C  [facility]  ____________________________________________    CC: Acne, on isotretinoin therapy    HPI:  Ms. Isamar Oliveira is a(n) 19 year old female who presents today for a telephone visit for acne on isotretinoin therapy. Patient has completed 3 months of Accutane.  She is currently taking 60 mg daily and is tolerating this well without any significant side effects.  She does endorse a few nosebleeds and dry lips which is tolerable.  Her skin is clearing nicely and she is happy with her progress.    Patient denies vision changes, headaches, myalgias, joint aches, GI upset, chronic diarrhea, blood in the stool, rashes, mood changes, suicidal ideation. Denies blood donation, surgeries/procedures and has not shared the medication.    Patient is otherwise feeling well, without additional skin concerns.    Physical Exam:  General: Awake, alert, and with appropriate affect.  Skin:  In-person physical examination was deferred as this was a phone visit.  Patient's submitted photographs were reviewed. Few acneiform  papules and macules on the face.    Medications:  Current Outpatient Medications   Medication Sig Dispense Refill    albuterol (PROAIR HFA/PROVENTIL HFA/VENTOLIN HFA) 108 (90 Base) MCG/ACT inhaler Inhale 2 puffs into the lungs every 6 hours as needed for shortness of breath, wheezing or cough 18 g 0    fluticasone-salmeterol (ADVAIR) 100-50 MCG/ACT inhaler Inhale 1 puff into the lungs every 12 hours 60 each 3    ISOtretinoin (ABSORICA) 30 MG capsule Take 2 capsules (60 mg) by mouth daily. 60 capsule 0     No current facility-administered medications for this visit.      Past Medical History:   There is no problem list on file for this patient.    No past medical history on file.    CC Referred Self, MD  No address on file on close of this encounter.

## 2025-01-13 ENCOUNTER — LAB (OUTPATIENT)
Dept: LAB | Facility: CLINIC | Age: 20
End: 2025-01-13
Payer: COMMERCIAL

## 2025-01-13 DIAGNOSIS — Z51.81 MEDICATION MONITORING ENCOUNTER: ICD-10-CM

## 2025-01-13 LAB — HCG UR QL: NEGATIVE

## 2025-01-13 PROCEDURE — 81025 URINE PREGNANCY TEST: CPT

## 2025-01-15 DIAGNOSIS — L70.0 ACNE VULGARIS: ICD-10-CM

## 2025-01-15 RX ORDER — ISOTRETINOIN 30 MG/1
60 CAPSULE ORAL DAILY
Qty: 60 CAPSULE | Refills: 0 | Status: SHIPPED | OUTPATIENT
Start: 2025-01-15

## 2025-03-06 ENCOUNTER — OFFICE VISIT (OUTPATIENT)
Dept: FAMILY MEDICINE | Facility: CLINIC | Age: 20
End: 2025-03-06
Payer: COMMERCIAL

## 2025-03-06 ENCOUNTER — NURSE TRIAGE (OUTPATIENT)
Dept: FAMILY MEDICINE | Facility: CLINIC | Age: 20
End: 2025-03-06

## 2025-03-06 VITALS
HEART RATE: 73 BPM | RESPIRATION RATE: 18 BRPM | SYSTOLIC BLOOD PRESSURE: 108 MMHG | WEIGHT: 142 LBS | BODY MASS INDEX: 24.24 KG/M2 | OXYGEN SATURATION: 98 % | DIASTOLIC BLOOD PRESSURE: 70 MMHG | TEMPERATURE: 97.5 F | HEIGHT: 64 IN

## 2025-03-06 DIAGNOSIS — J01.00 ACUTE NON-RECURRENT MAXILLARY SINUSITIS: Primary | ICD-10-CM

## 2025-03-06 ASSESSMENT — PAIN SCALES - GENERAL: PAINLEVEL_OUTOF10: SEVERE PAIN (7)

## 2025-03-06 NOTE — PROGRESS NOTES
Assessment & Plan     Acute non-recurrent maxillary sinusitis  Consistent with sinusitis.  Initially likely influenza or similar influenza-like illness which resolved followed by sinusitis symptoms that have been progressively worsening.  At this point would recommend antibiotics which was reviewed.  Okay to continue as needed ibuprofen and DayQuil.  - amoxicillin-clavulanate (AUGMENTIN) 875-125 MG tablet; Take 1 tablet by mouth 2 times daily for 7 days.                Delmar Penn is a 19 year old, presenting for the following health issues:  Sinus Problem      3/6/2025     1:18 PM   Additional Questions   Roomed by Jean-Claude   Accompanied by Self     Sinus Problem     History of Present Illness       Reason for visit:  Sinus and cough  Symptom onset:  3-4 weeks ago  Symptoms include:  Sinus  Symptom intensity:  Moderate  Symptom progression:  Staying the same  Had these symptoms before:  No  What makes it worse:  No  What makes it better:  No   She is taking medications regularly.          Acute Illness  Acute illness concerns: Sinus  Onset/Duration: about 1 month  Symptoms:  Fever: No  Chills/Sweats: No  Headache (location?): No  Sinus Pressure: YES  Conjunctivitis:  No  Ear Pain: no  Rhinorrhea: YES  Congestion: YES  Sore Throat: No  Cough: YES  Wheeze: YES  Decreased Appetite: No  Nausea: No  Vomiting: No  Diarrhea: No  Dysuria/Freq.: No  Dysuria or Hematuria: No  Fatigue/Achiness: No  Sick/Strep Exposure: No  Therapies tried and outcome: Dayquil & IBU  Initially sick about a month ago, seems like most people on her college campus were also ill.  She had a few days of fever and URI symptoms that seem like they resolved however her nasal symptoms never did.  Has never had issues with her sinuses in the past.  Since that time she has had consistent sinus pressure, pain, nasal drainage.  Seems like symptoms are gradually worsening.  Difficulty bending over due to the discomfort.  Having associated headaches.   "Did feel like it was worse on the left side but now both sides seem affected.  Some associated cough and postnasal drip.  Does have a wheezing history and uses albuterol as needed, has used it a few times in the past month and it has been effective.          Objective    /70 (BP Location: Left arm, Patient Position: Chair, Cuff Size: Adult Regular)   Pulse 73   Temp 97.5  F (36.4  C) (Temporal)   Resp 18   Ht 1.626 m (5' 4\")   Wt 64.4 kg (142 lb)   SpO2 98%   BMI 24.37 kg/m    Body mass index is 24.37 kg/m .  Physical Exam   GENERAL: alert and no distress  EYES: Eyes grossly normal to inspection, PERRL and conjunctivae and sclerae normal  HENT: normal cephalic/atraumatic, ear canals and TM's normal, nasal mucosa edematous , oropharynx clear, oral mucous membranes moist, and sinuses: maxillary tenderness on both sides, more significant to left  NECK: no adenopathy, no asymmetry, masses, or scars  RESP: lungs clear to auscultation - no rales, rhonchi or wheezes  CV: regular rate and rhythm, normal S1 S2, no S3 or S4, no murmur, click or rub            Signed Electronically by: Gabriella العلي PA-C    "

## 2025-03-06 NOTE — TELEPHONE ENCOUNTER
Writer was asked to triage patient re: appt today, concern for asthma/breathing issues.    S-(situation): Sick for ~1 month, currently worst symptoms is nasal/sinus congestion (rates it 7/10) and new onset cough (5-6/10). Also have tension HAs. Sickness has somewhat triggered asthma, using albuterol inhaler for wheezing about once every other day.    B-(background): Hx asthma, also states she's been fasting for Ramadan    A-(assessment):   - Patient denies any current difficulty breathing or wheezing, no SOB  - Has had this congestion for almost a month now, cough is new as of a few days ago. Moderate cough  - Intermittent tension HAs (relieved by tylenol) and lightheadedness, has been noticing more of these while fasting  - Not having sinus pain per se, but feels full  - Denies any ear pain or fevers    R-(recommendations): Recommended keeping appt - with asthma concerns and new cough after prolonged illness, if she would like we can switch the telephone appt to virtual so provider can listen to lungs, we can do that. Patient aware, would like to be seen in person - appt changed to in person. Aware of arrival time. No other questions or concerns.    Did update provider re: patient and appt change      Radha Johnson, RN, BSN  [facility]  [place], [place] and [place]    Reason for Disposition   Sinus congestion (pressure, fullness) present > 10 days    Additional Information   Negative: Sounds like a life-threatening emergency to the triager   Negative: Difficulty breathing, and not from stuffy nose (e.g., not relieved by cleaning out the nose)   Negative: SEVERE headache and has fever   Negative: Patient sounds very sick or weak to the triager   Negative: SEVERE sinus pain (e.g., excruciating)   Negative: Severe headache   Negative: Redness or swelling on the cheek, forehead, or around the eye   Negative: Fever > 103 F (39.4 C)   Negative: Fever > 101 F (38.3 C) and over 60 years of  age   Negative: Fever > 100 F (37.8 C) and has diabetes mellitus or a weak immune system (e.g., HIV positive, cancer chemotherapy, organ transplant, splenectomy, chronic steroids)   Negative: Fever > 100 F (37.8 C) and bedridden (e.g., CVA, chronic illness, recovering from surgery)   Negative: Fever present > 3 days (72 hours)   Negative: Fever returns after gone for over 24 hours and symptoms worse or not improved   Negative: Sinus pain (not just congestion) and fever   Negative: Earache    Protocols used: Sinus Pain or Congestion-A-[place]

## 2025-03-06 NOTE — PATIENT INSTRUCTIONS
At [facility], we strive to deliver an exceptional experience to you, every time we see you. If you receive a survey, please let us know what we are doing well and/or what we could improve upon, as we do value your feedback.  If you have MyChart, you can expect to receive results automatically within 24 hours of their completion.  Your provider will send a note interpreting your results as well.   If you do not have MyChart, you should receive your results in about a week by mail.    Your care team:                            Family Medicine Internal Medicine   MD James Acosta, MD Amanda Bonilla, MD Walter Melton, MD Nannette Pineda, PAHilarioC    Ivan De Paz, MD Pediatrics   Bethany Paniagua, MD Tamara Vega, MD Kylie Ornelas, APRN CNP Tiffany Lee APRN CNP   MD Karen Washington, MD Padmaja Santiago, CNP     Fawad Peters, CNP Same-Day Provider (No follow-up visits)   TERESA Davalos, DNP Christal Ratliff, TERESA Reynolds, FNP, BC MARY GarciaC     Clinic hours: Monday - Thursday 7 am-6 pm; Fridays 7 am-5 pm.   Urgent care: Monday - Friday 10 am- 8 pm; Saturday and Sunday 9 am-5 pm.    Clinic: [phone]       [facility]: Monday - Thursday 8 am - 7 pm; Friday 8 am - 6 pm  [facility]: [phone]

## 2025-03-13 ENCOUNTER — VIRTUAL VISIT (OUTPATIENT)
Dept: DERMATOLOGY | Facility: CLINIC | Age: 20
End: 2025-03-13
Payer: COMMERCIAL

## 2025-03-13 DIAGNOSIS — Z51.81 MEDICATION MONITORING ENCOUNTER: ICD-10-CM

## 2025-03-13 DIAGNOSIS — L70.0 ACNE VULGARIS: Primary | ICD-10-CM

## 2025-03-13 NOTE — PROGRESS NOTES
[facility] Dermatology Note  Encounter Date: Mar 13, 2025    Teledermatology telephone visit information:  - Location of patient: [place]  - Patient presented as: return  - Location of teledermatologist:  ([facility] )  - Image quality and interpretability: acceptable  - Provider has received verbal consent for a Video/Photos Visit from the patient? YES  - Date of images: 3/12/2025  - Service start time:4:06 pm  - Service end time: 4:12 pm  - Date of report: 3/13/2025     Dermatology Problem List:  # Acne vulgaris  Current tx: isotretinoin 60 mg (initiated 10/9/24)  Previous tx: clindamycin 1% gel, Benzoyl peroxide wash, tretinoin 0.025% cream nightly, Azelaic Acid 15%  # Hyperpigmentation/PIH, bilateral axillae  # Dark circles around eyes, consider skin better eye cream, skin discoloration defense   -teamine eye cream. Revision for now, okay to buy from them directly  ____________________________________________    Assessment & Plan:     # Acne vulgaris, on Isotretinoin therapy  # High risk medication monitoring  - Overall, she discontinued the medication as of 1 month ago due to side effects of anxiety, nosebleeds, and dryness.  Her skin has been completely clear of acne and she is feeling well.  I discussed that we will update this within iPLEDGE that she has discontinued.  She will need to repeat a urine pregnancy test at this time and in 30 days from now per iPledge since we are discontinuing the medication.  Patient verbalizes understanding and agrees with the above plan.  She had no further questions.    Previous daily dose: 60 mg/day  Cumulative dose to date: 5400 mg (Patient discontinued early due to side effects)  Weight-based target dose: 7,788 to 9,735 mg (120-150 mg/kg can go up to 220 mg/kg in this 64.9 kg patient)  Birth control:  Abstinence    Isotretinoin (Accutane) education:  -While on Accutane: do not use other topical acne medications. Do not share  medication. Do not get pregnant (including one month after stopping medication). Do not donate blood (including 1 month after stopping medication). Do not wax. -Do not get laser, peels, or aggressive facials while on Accutane of for 6 months after.   -Wait 6 months after stopping accutane before getting piercing, tattoos, and/or laser treatment.      Follow-up: prn for new or changing lesions or new concerns    All risks, benefits and alternatives were discussed with patient.  Patient is in agreement and understands the assessment and plan.  All questions were answered.  Rachael Mcdermott PA-C  [facility] Dermatology  ____________________________________________    CC: Acne, on isotretinoin therapy    HPI:  Ms. Isamar Oliveira is a(n) 19 year old female who presents today for a telephone visit for acne on isotretinoin therapy. Patient has completed 4 months of Accutane.  She was last seen in dermatology on 1/9/2025 and was continued on 60 mg daily of isotretinoin therapy.  As of early February, she discontinued treatment on her own.  She discontinued the medication secondary to dryness, nosebleeds, and due to anxiety.  She reports that mentally she was feeling not like her self and noticed increased with her anxiety so she discontinued the medication. Her skin has been completely clear and has remained clear since stopping the medication 1 month ago. She overall is feeling fine now. She has continued to remain abstinent.    Patient is otherwise feeling well, without additional skin concerns.    Physical Exam:  General: Awake, alert, and with appropriate affect.  Skin: In-person physical examination was deferred as this was a phone visit.  Patient's submitted photographs were reviewed. Face is clear, no acneiform lesions present on photos.    Medications:  Current Outpatient Medications   Medication Sig Dispense Refill    albuterol (PROAIR HFA/PROVENTIL HFA/VENTOLIN HFA) 108 (90 Base) MCG/ACT inhaler Inhale 2 puffs  into the lungs every 6 hours as needed for shortness of breath, wheezing or cough 18 g 0    amoxicillin-clavulanate (AUGMENTIN) 875-125 MG tablet Take 1 tablet by mouth 2 times daily for 7 days. 14 tablet 0    fluticasone-salmeterol (ADVAIR) 100-50 MCG/ACT inhaler Inhale 1 puff into the lungs every 12 hours 60 each 3    ISOtretinoin (ABSORICA) 30 MG capsule Take 2 capsules (60 mg) by mouth daily. 60 capsule 0     No current facility-administered medications for this visit.      Past Medical History:   There is no problem list on file for this patient.    No past medical history on file.    CC Referred Self, MD  No address on file on close of this encounter.

## 2025-03-13 NOTE — LETTER
3/13/2025      Isamar Oliveira  [address]  [place] [address]      Dear Colleague,    Thank you for referring your patient, Isamar Oliveira, to the [facility]. Please see a copy of my visit note below.    [facility] Dermatology Note  Encounter Date: Mar 13, 2025    Teledermatology telephone visit information:  - Location of patient: [place]  - Patient presented as: return  - Location of teledermatologist:  ([facility] )  - Image quality and interpretability: acceptable  - Provider has received verbal consent for a Video/Photos Visit from the patient? YES  - Date of images: 3/12/2025  - Service start time:4:06 pm  - Service end time: 4:12 pm  - Date of report: 3/13/2025     Dermatology Problem List:  # Acne vulgaris  Current tx: isotretinoin 60 mg (initiated 10/9/24)  Previous tx: clindamycin 1% gel, Benzoyl peroxide wash, tretinoin 0.025% cream nightly, Azelaic Acid 15%  # Hyperpigmentation/PIH, bilateral axillae  # Dark circles around eyes, consider skin better eye cream, skin discoloration defense   -teamine eye cream. Revision for now, okay to buy from them directly  ____________________________________________    Assessment & Plan:     # Acne vulgaris, on Isotretinoin therapy  # High risk medication monitoring  - Overall, she discontinued the medication as of 1 month ago due to side effects of anxiety, nosebleeds, and dryness.  Her skin has been completely clear of acne and she is feeling well.  I discussed that we will update this within iPLEDGE that she has discontinued.  She will need to repeat a urine pregnancy test at this time and in 30 days from now per iPledge since we are discontinuing the medication.  Patient verbalizes understanding and agrees with the above plan.  She had no further questions.    Previous daily dose: 60 mg/day  Cumulative dose to date: 5400 mg (Patient discontinued early due to side  effects)  Weight-based target dose: 7,788 to 9,735 mg (120-150 mg/kg can go up to 220 mg/kg in this 64.9 kg patient)  Birth control:  Abstinence    Isotretinoin (Accutane) education:  -While on Accutane: do not use other topical acne medications. Do not share medication. Do not get pregnant (including one month after stopping medication). Do not donate blood (including 1 month after stopping medication). Do not wax. -Do not get laser, peels, or aggressive facials while on Accutane of for 6 months after.   -Wait 6 months after stopping accutane before getting piercing, tattoos, and/or laser treatment.      Follow-up: prn for new or changing lesions or new concerns    All risks, benefits and alternatives were discussed with patient.  Patient is in agreement and understands the assessment and plan.  All questions were answered.  Rachael Mcdermott PA-C  [facility] Dermatology  ____________________________________________    CC: Acne, on isotretinoin therapy    HPI:  Ms. Isamar Oliveira is a(n) 19 year old female who presents today for a telephone visit for acne on isotretinoin therapy. Patient has completed 4 months of Accutane.  She was last seen in dermatology on 1/9/2025 and was continued on 60 mg daily of isotretinoin therapy.  As of early February, she discontinued treatment on her own.  She discontinued the medication secondary to dryness, nosebleeds, and due to anxiety.  She reports that mentally she was feeling not like her self and noticed increased with her anxiety so she discontinued the medication. Her skin has been completely clear and has remained clear since stopping the medication 1 month ago. She overall is feeling fine now. She has continued to remain abstinent.    Patient is otherwise feeling well, without additional skin concerns.    Physical Exam:  General: Awake, alert, and with appropriate affect.  Skin: In-person physical examination was deferred as this was a phone visit.  Patient's submitted  photographs were reviewed. Face is clear, no acneiform lesions present on photos.    Medications:  Current Outpatient Medications   Medication Sig Dispense Refill     albuterol (PROAIR HFA/PROVENTIL HFA/VENTOLIN HFA) 108 (90 Base) MCG/ACT inhaler Inhale 2 puffs into the lungs every 6 hours as needed for shortness of breath, wheezing or cough 18 g 0     amoxicillin-clavulanate (AUGMENTIN) 875-125 MG tablet Take 1 tablet by mouth 2 times daily for 7 days. 14 tablet 0     fluticasone-salmeterol (ADVAIR) 100-50 MCG/ACT inhaler Inhale 1 puff into the lungs every 12 hours 60 each 3     ISOtretinoin (ABSORICA) 30 MG capsule Take 2 capsules (60 mg) by mouth daily. 60 capsule 0     No current facility-administered medications for this visit.      Past Medical History:   There is no problem list on file for this patient.    No past medical history on file.    CC Referred Self, MD  No address on file on close of this encounter.       Again, thank you for allowing me to participate in the care of your patient.        Sincerely,        Moni Tenorio PA-C    Electronically signed

## 2025-04-09 ENCOUNTER — LAB (OUTPATIENT)
Dept: LAB | Facility: CLINIC | Age: 20
End: 2025-04-09
Payer: COMMERCIAL

## 2025-04-09 DIAGNOSIS — Z51.81 MEDICATION MONITORING ENCOUNTER: ICD-10-CM

## 2025-04-09 LAB — HCG UR QL: NEGATIVE

## 2025-04-09 PROCEDURE — 81025 URINE PREGNANCY TEST: CPT

## 2025-07-14 ENCOUNTER — PATIENT OUTREACH (OUTPATIENT)
Dept: CARE COORDINATION | Facility: CLINIC | Age: 20
End: 2025-07-14
Payer: COMMERCIAL

## 2025-07-28 ENCOUNTER — PATIENT OUTREACH (OUTPATIENT)
Dept: CARE COORDINATION | Facility: CLINIC | Age: 20
End: 2025-07-28
Payer: COMMERCIAL